# Patient Record
Sex: FEMALE | Race: WHITE | Employment: UNEMPLOYED | URBAN - METROPOLITAN AREA
[De-identification: names, ages, dates, MRNs, and addresses within clinical notes are randomized per-mention and may not be internally consistent; named-entity substitution may affect disease eponyms.]

---

## 2019-10-28 ENCOUNTER — OFFICE VISIT (OUTPATIENT)
Dept: FAMILY MEDICINE CLINIC | Age: 49
End: 2019-10-28

## 2019-10-28 VITALS
HEART RATE: 74 BPM | OXYGEN SATURATION: 100 % | HEIGHT: 67 IN | SYSTOLIC BLOOD PRESSURE: 148 MMHG | RESPIRATION RATE: 18 BRPM | DIASTOLIC BLOOD PRESSURE: 98 MMHG | BODY MASS INDEX: 27.15 KG/M2 | WEIGHT: 173 LBS | TEMPERATURE: 98.2 F

## 2019-10-28 DIAGNOSIS — G89.29 CHRONIC NONINTRACTABLE HEADACHE, UNSPECIFIED HEADACHE TYPE: ICD-10-CM

## 2019-10-28 DIAGNOSIS — K58.9 IRRITABLE BOWEL SYNDROME, UNSPECIFIED TYPE: ICD-10-CM

## 2019-10-28 DIAGNOSIS — K72.90 LIVER FAILURE WITHOUT HEPATIC COMA, UNSPECIFIED CHRONICITY (HCC): ICD-10-CM

## 2019-10-28 DIAGNOSIS — E07.9 THYROID DISEASE: ICD-10-CM

## 2019-10-28 DIAGNOSIS — K57.92 DIVERTICULITIS: ICD-10-CM

## 2019-10-28 DIAGNOSIS — R52 GENERALIZED BODY ACHES: Primary | ICD-10-CM

## 2019-10-28 DIAGNOSIS — R51.9 CHRONIC NONINTRACTABLE HEADACHE, UNSPECIFIED HEADACHE TYPE: ICD-10-CM

## 2019-10-28 RX ORDER — DULOXETIN HYDROCHLORIDE 60 MG/1
60 CAPSULE, DELAYED RELEASE ORAL DAILY
Qty: 30 CAP | Refills: 2 | Status: SHIPPED | OUTPATIENT
Start: 2019-10-28

## 2019-10-28 RX ORDER — DULOXETIN HYDROCHLORIDE 30 MG/1
30 CAPSULE, DELAYED RELEASE ORAL 2 TIMES DAILY
COMMUNITY
End: 2019-10-28 | Stop reason: SDUPTHER

## 2019-10-28 RX ORDER — BUTALBITAL, ACETAMINOPHEN AND CAFFEINE 300; 40; 50 MG/1; MG/1; MG/1
CAPSULE ORAL
COMMUNITY
End: 2020-09-29 | Stop reason: SDUPTHER

## 2019-10-28 RX ORDER — ZOLMITRIPTAN 5 MG/1
SPRAY NASAL
COMMUNITY
End: 2022-08-18

## 2019-10-28 RX ORDER — DULOXETIN HYDROCHLORIDE 30 MG/1
30 CAPSULE, DELAYED RELEASE ORAL DAILY
Qty: 30 CAP | Refills: 2 | Status: SHIPPED | OUTPATIENT
Start: 2019-10-28 | End: 2022-08-18

## 2019-10-28 RX ORDER — DICYCLOMINE HYDROCHLORIDE 10 MG/1
10 CAPSULE ORAL
COMMUNITY
End: 2020-07-15

## 2019-10-28 NOTE — PROGRESS NOTES
Bhupinder Gomez is a 51 y.o. female here today to establish care. She has a hx of migraines and thyroid issues. Recently moved from [place] in July. Patient declined the flu shot.

## 2019-10-28 NOTE — PROGRESS NOTES
Patient: Abe Tolbert  Date of Service: 10/28/2019   Provider: ABBI Rooney     REASON FOR VISIT:   Chief Complaint   Patient presents with    Migraine    Thyroid Problem        HISTORY OF PRESENT ILLNESS:   Abe Tolbert is a 51 y.o. female who presents to the office to establish care and for for follow up for Chronic health issues. Has a history of Migraines, Liver Failure last December from medication, Diverticulitis, IBS and Thyroid disease (Hashimoto). Migraine: take Fioricet as needed; have them 3 to 4 times a week but they are not as bad as they sued to be. Has aura, when she has migraine, she looses peripheral vision. Was seen Neurology. Diverticulitis/IBS: Has seen GI for this. Had colonoscopy last year and t was negative for polyps. Thyroid Disease: has Thyroid nodules that have been biopsied and were negative. Was seeing and Endocrinologist. And last saw him right before she moved to [place]. States that her last PCP and Endocrinologist recommended that she sees Rheumatology because of generalized body pain (ankles, neck, fingers upper back and severe cramps in feet and thighs that last for a long time). She was RA and Lupus and both tests were negative. She is very active, walks a lot but no cardio. Brought in medical record which will be reviewed. ALLERGIES:   No Known Allergies     ACTIVE MEDICAL PROBLEMS:  There is no problem list on file for this patient. MEDICATIONS:   Current Outpatient Medications   Medication Sig Dispense Refill    DULoxetine (CYMBALTA) 30 mg capsule Take 30 mg by mouth two (2) times a day.  butalbital-acetaminophen-caff (FIORICET) -40 mg per capsule Take  by mouth every four (4) hours as needed for Pain.  dicyclomine (BENTYL) 10 mg capsule Take 10 mg by mouth 4 times daily (before meals and nightly).  ZOLMitriptan (ZOMIG) 5 mg nasal solution by Nasal route once as needed for Migraine.       MAGNESIUM PO Take by mouth. ROS:   Pertinent positive as above in HPI. All others negative. PHYSICAL EXAMINATION:  Visit Vitals  BP (!) 148/98 (BP 1 Location: Right arm, BP Patient Position: Sitting)   Pulse 74   Temp 98.2 °F (36.8 °C)   Resp 18   Ht 5' 7\" (1.702 m)   Wt 173 lb (78.5 kg)   SpO2 100%   BMI 27.10 kg/m²      Body mass index is 27.1 kg/m². Appearance: alert, well appearing, and in no distress. ENT normal.  Neck supple. No adenopathy or thyromegaly. PERRLA. Lungs are clear, good air entry, no wheezes, rhonchi or rales. S1 and S2 normal, no murmurs, regular rate and rhythm. Abdomen soft without tenderness, guarding, mass or organomegaly. No bruit. Extremities show no edema, normal peripheral pulses. Neurological is normal, no focal findings. ASSESSMENT/PLAN:  Diagnoses and all orders for this visit:    1. Generalized body aches  -     REFERRAL TO RHEUMATOLOGY  -     DULoxetine (CYMBALTA) 30 mg capsule; Take 1 Cap by mouth daily.  -     DULoxetine (CYMBALTA) 60 mg capsule; Take 1 Cap by mouth daily. 2. Thyroid disease    3. Liver failure without hepatic coma, unspecified chronicity ([address]    4. Irritable bowel syndrome, unspecified type    5. Diverticulitis    6. Chronic nonintractable headache, unspecified headache type    Patient would like to just be referred to Rheumatology for now. She states that she is tired to going from one specialist to the next. If lab is abnormal she will ask for referral.     Patient advised to return to clinic if symptoms persist or to ED if they become worse  Patient verbalized understanding to above instructions. Follow-up and Dispositions    · Return in about 3 months (around 1/28/2020).          ABBI Urbano   10/28/2019   12:47 PM

## 2019-10-28 NOTE — PATIENT INSTRUCTIONS
Learning About the Low FODMAP Diet for Irritable Bowel Syndrome (IBS)  What is the low-FODMAP diet? A low-FODMAP diet is a way to find out what foods give you digestion problems. You stop eating certain high-FODMAP foods for about 2 months. Then you add them back to see how your body reacts. This is called a \"challenge diet. \" A dietitian or doctor can help you follow this diet. FODMAPs are carbohydrates. They are in many types of foods. FODMAP stands for:  · F ermentable. · O ligosaccharides. · D isaccharides. · M onosaccharides. · A nd p olyols. If you have digestive problems, some of these foods can make your symptoms worse. When you are on this diet, you can still eat certain fruits and vegetables. You can also eat certain grains, meats, fish, and lactose-free milks. What is it used for? If you have irritable bowel syndrome (IBS), you can ease your symptoms by not eating some types of foods. Some people also use this diet for inflammatory bowel disease (IBD) or some food intolerances. High-FODMAP foods can be hard to digest. They pull more fluid into your intestines. They are also easily fermented. This can lead to bloating, belly pain, gas, and diarrhea. The low-FODMAP diet can help you figure out what foods to avoid. And it can help you find foods that are easier to digest.  This diet can help with symptoms of some digestive diseases. But it's not a cure. You will still need to manage your condition. How does it work? You will work with a doctor or dietitian when you start the diet. At first, you won't eat any high-FODMAP foods for a few weeks. Go to www.[employer]. [employer] to learn more about this diet. Delma Goins also find links to an dayton for your phone or other device. You'll find low-FODMAP cookbooks there too. After 6 to 8 weeks, you will start to try high-FODMAP foods again. You will add those foods back to your diet, one group at a time.  Your doctor or dietitian will probably have you wait a few days before you add each new group of those foods. Keep a food diary. You can write down the foods you try and note how they make you feel. After a few weeks, you may have a better idea of what foods you should avoid and what foods make you feel your best.  What are the risks? There is some risk of not getting all of the vitamins and nutrients you need on the low-FODMAP diet. These include:  · Folate. · Thiamin. · Vitamin B6.  · Calcium. · Vitamin D. Your dietitian or doctor can help you find other sources of these if needed. This diet may limit your fiber intake. Try to plan your meals to include other sources of fiber. What foods are on the low-FODMAP diet? Here is a guide to foods that you can eat, plus the foods that you should avoid, when you are on the low-FODMAP diet. Grains  Okay to eat: Foods made from grains like arrowroot, buckwheat, corn, millet, and oats. You can also eat potato, quinoa, rice, sorghum, tapioca, and teff. Cereals, pasta, breads, corn tortillas and baked goods made from these grains are also okay. (These grains may be labeled \"gluten-free. \")  Avoid: Grains like wheat, barley, and rye. Avoid ingredients such as bulgur, couscous, durum, and semolina. And avoid cereals, breads, and pastas made from these grains. Avoid chickpea, lentil, and pea flour. Proteins  Okay to eat: Most meat, fish, and eggs without high-FODMAP sauces. You can have small amounts of almonds or hazelnuts (10 nuts). Macadamia nuts, peanuts, pecans, pine nuts, and walnuts are also okay. You can also eat cinthya and pumpkin seeds, tofu, and tempeh. Avoid: Beans, chickpeas, lentils, and soybeans. Avoid pistachio and cashew nuts. And some sausages may have high-FODMAP ingredients. Dairy  Okay to eat: Lactose-free dairy milks. Rice milk and almond milk are okay. So are lactose-free yogurts, kefirs, ice creams, and sorbet from low-FODMAP fruits and sweeteners. (These are often labeled \"lactose-free. \") You can have small amounts (2 Tbsp) of cottage, cream, or ricotta cheese. Hard cheeses like cheddar, [place], ROSS, and Swiss are okay. So are small amounts (1 oz) of aged or ripened cheeses like Brie, blue, and feta. Avoid: Milk, including cow, goat, and sheep. Avoid condensed or evaporated milk, buttermilk, custard, cream, sour cream, yogurt, and ice cream. Avoid soy milk. (Check sauces for dairy ingredients.)  Vegetables  Okay to eat: Bamboo shoots, bell peppers, bok jacky, up to ½ cup of broccoli or cabbage (red or white), and cucumbers. Eggplant, green beans, lettuce, olives, parsnips, and potatoes are okay to eat. So are pumpkin, rutabaga, seaweed, sprouts, Swiss chard, and spinach. You can eat scallions (green part only) and squash (not butternut). You can eat tomatoes, turnips, watercress, yams, and zucchini. You can also have small amounts of artichoke hearts (from can, 1 oz), carrots, corn (½ cob), and sweet potato (½ cup). Avoid: Artichokes, asparagus, [place] sprouts, ewa cabbage, cauliflower, and celery. And avoid garlic, leeks, mushrooms, okra, onions, scallions (white part), shallots, and peas. Fruits  Okay to eat: Bananas, blueberries, cantaloupe, coconut, grapes, and honeydew. Kiwi, rakan, limes, oranges, passion fruit, papaya, and pineapple are also okay. You can eat plantain, raspberries, rhubarb, star fruit, strawberries, tangelo, and tangerine. You can also have small amounts of dried banana chips (up to 10 chips), dried cranberries (1 Tbsp), and shredded coconut (up to ¼ cup). Avoid: Apples, applesauce, apricots, avocados, blackberries, boysenberries, and cherries. Also avoid dates, figs, grapefruit, guava, lychee, and mangoes. Don't eat nectarines, peaches, pears, persimmon, plums, prunes, tamarillo, or watermelon. And limit most canned and dried fruits.   Oils, spices, condiments, and sweeteners  Okay to eat: Vegetable oils (including garlic infused), butter, ghee, lard, and margarine (no trans fat). You can have most fresh herbs like basil, chives, coriander, ovidio, parsley, rosemary and thyme. You can have salt, jams made from low-FODMAP fruits, mayonnaise, and mustard. Soy sauce, hot sauce (no garlic), tamari, and vinegar are also okay. Sweeteners that are okay include sugar (sucrose), powdered (confectioner's) sugar, brown sugar, glucose, and maple syrup. You can also have some artificial sweeteners like aspartame, saccharine, and stevia. Avoid: Chutneys, hummus, jellies, garlic sauces, and gravies made with onion or garlic. Avoid pickles, relish, some salad dressings and soup stocks, salsa, and tomato paste. And avoid sauces and other foods with high fructose corn syrup, honey, molasses, and agave. Avoid artificial sweeteners (isomalt, mannitol, malitol, sorbitol, and xylitol). Avoid corn syrup solids, fructose, fruit juice concentrate, and polydextrose. Other foods and drinks  Okay to have: Water, soda water, tonic, soft drinks sweetened with sugar, ½ cup of low-FODMAP fruit juice, and most teas and alcohols. You can also eat foods made with baking powder and soda, cocoa, and gelatin. Avoid: Juices from high-FODMAP fruits and vegetables. And avoid fortified luisana, chamomile and fennel teas, chicory-based drinks and coffee substitutes, and bouillon cubes. Follow-up care is a key part of your treatment and safety. Be sure to make and go to all appointments, and call your doctor if you are having problems. It's also a good idea to know your test results and keep a list of the medicines you take. Where can you learn more? Go to http://gustavo-teresita.info/. Enter L235 in the search box to learn more about \"Learning About the Low FODMAP Diet for Irritable Bowel Syndrome (IBS). \"  Current as of: November 7, 2018  Content Version: 12.2  © 2173-5341 [employer]  Care instructions adapted under license by [employer] (which disclaims liability or warranty for this information). If you have questions about a medical condition or this instruction, always ask your healthcare professional. [place] [address] any warranty or liability for your use of this information.

## 2019-11-04 ENCOUNTER — TELEPHONE (OUTPATIENT)
Dept: FAMILY MEDICINE CLINIC | Age: 49
End: 2019-11-04

## 2019-11-04 NOTE — TELEPHONE ENCOUNTER
Rheumatology office called stating that by patients medication list, their fibromyalgia panel is closed and will need to be referred elsewhere.

## 2020-04-14 ENCOUNTER — VIRTUAL VISIT (OUTPATIENT)
Dept: NEUROLOGY | Age: 50
End: 2020-04-14

## 2020-04-14 VITALS — WEIGHT: 175 LBS | HEIGHT: 67 IN | BODY MASS INDEX: 27.47 KG/M2

## 2020-04-14 DIAGNOSIS — G43.119 INTRACTABLE MIGRAINE WITH AURA WITHOUT STATUS MIGRAINOSUS: Primary | ICD-10-CM

## 2020-04-14 RX ORDER — SUMATRIPTAN 6 MG/.5ML
6 INJECTION, SOLUTION SUBCUTANEOUS ONCE
COMMUNITY
End: 2020-07-15

## 2020-04-14 RX ORDER — RIZATRIPTAN BENZOATE 10 MG/1
10 TABLET ORAL
COMMUNITY
End: 2020-07-15

## 2020-04-14 RX ORDER — PROPRANOLOL HYDROCHLORIDE 20 MG/1
20 TABLET ORAL 2 TIMES DAILY
Qty: 60 TAB | Refills: 3 | Status: SHIPPED | OUTPATIENT
Start: 2020-04-14 | End: 2020-05-05 | Stop reason: SDUPTHER

## 2020-04-14 NOTE — PROGRESS NOTES
Chief Complaint   Patient presents with    Migraine    New Patient       Linda Flannery presents today for   Chief Complaint   Patient presents with    Migraine    New Patient       Is someone accompanying this pt? Virtual visit [phone]    Is the patient using any DME equipment during [address]? no    Depression Screening:  3 most recent PHQ Screens 10/28/2019   Little interest or pleasure in doing things Not at all   Feeling down, depressed, irritable, or hopeless Several days   Total Score PHQ 2 1       Learning Assessment:  Learning Assessment 10/28/2019   PRIMARY LEARNER Patient   HIGHEST LEVEL OF EDUCATION - PRIMARY LEARNER  GRADUATED HIGH SCHOOL OR GED   BARRIERS PRIMARY LEARNER VISUAL   CO-LEARNER CAREGIVER No   PRIMARY LANGUAGE ENGLISH   LEARNER PREFERENCE PRIMARY DEMONSTRATION   ANSWERED BY patient   RELATIONSHIP SELF       Abuse Screening:  No flowsheet data found. Fall Risk  No flowsheet data found. Coordination of Care:  1. Have you been to the ER, urgent care clinic since your last visit? Hospitalized since your last visit? no    2. Have you seen or consulted any other health care providers outside of the [address] [place] [address] since your last visit? Include any pap smears or colon screening.  no

## 2020-04-14 NOTE — PROGRESS NOTES
Consent: Kandis Mazariegos, who was seen by synchronous (real-time) audio-video technology, and/or her healthcare decision maker, is aware that this patient-initiated, Telehealth encounter on 4/14/2020 is a billable service, with coverage as determined by her insurance carrier. She is aware that she may receive a bill and has provided verbal consent to proceed: Yes. Assessment & Plan:   Diagnoses and all orders for this visit:    1. Intractable migraine with aura without status migrainosus  -     propranoloL (INDERAL) 20 mg tablet; Take 1 Tab by mouth two (2) times a day for 30 days. A 53years old female patient with history of migraine with aura currently not on any prophylaxis came for establishing care. Moved from [place] about 2 months ago. And had follow-up with a neurologist there. Had been on multiple prophylactic medications in the past.  For her chronic daily headache she was also on Botox but stopped because of insurance. Currently has about 15 headaches per month. Cao [place] was stopped because of liver failure. We will start her on propranolol 20 mg p.o. twice daily and will progressively increase the dose. We will continue with as needed Zomig. Advised on regular sleep and eating pattern. Nonmedical options like exercise and other relaxation techniques discussed. We will try to get records from her neurologist in [place]. We will see her in the clinic in 3 months time. Follow-up and Dispositions    · Return in about 3 months (around 7/14/2020). 712  Subjective:   Kandis Mazariegos is a 53 y.o. female who was seen for Migraine and New Patient  Mrs. Jose R Page is a 73-year-old female patient here for evaluation of headache. This is a virtual/video encounter. She moved from [place] about 2 months ago and used to follow for her headache with a neurologist there. She has been having headaches for about 20 years and is progressively getting worse through the years.   About 20 minutes before her headache she developed visual changes where she has difficulty seeing from the left side with occasional spots and zigzag lines. Headache is mostly on the left side. Is throbbing, mostly 4 out of 10 but before coming here it was about 8 out of 10. It is associated with nausea and vomiting. She has photophobia and phonophobia. It lasts for about 6 hours. Currently has about 15 headaches per month. Not on any prophylactic medications. Headache is relieved by Fioricet and also takes Zomig. Headache triggers include weather changes, lack of sleep, missing a meal.  She takes her pain medications about 2 times per week. Through the years she has been on different prophylactic medications including propranolol, topiramate, Elavil, Aimovig, and Botox. She developed liver failure while on Aimovig and stopped. She was doing good with Botox but could not be covered by her insurance and stopped it about a year ago. She said that she had MRIs of her brain in the past and she was told that were unremarkable. Prior to Admission medications    Medication Sig Start Date End Date Taking? Authorizing Provider   rizatriptan (MAXALT) 10 mg tablet Take 10 mg by mouth once as needed for Migraine. May repeat in 2 hours if needed   Yes Provider, Historical   SUMAtriptan (IMITREX) 6 mg/0.5 mL injection 6 mg by SubCUTAneous route once. Yes Provider, Historical   propranoloL (INDERAL) 20 mg tablet Take 1 Tab by mouth two (2) times a day for 30 days. 4/14/20 5/14/20 Yes Lis Marlow MD   butalbital-acetaminophen-caff (FIORICET) -40 mg per capsule Take  by mouth every four (4) hours as needed for Pain. Yes Provider, Historical   ZOLMitriptan (ZOMIG) 5 mg nasal solution by Nasal route once as needed for Migraine. Yes Provider, Historical   MAGNESIUM PO Take  by mouth. Yes Provider, Historical   dicyclomine (BENTYL) 10 mg capsule Take 10 mg by mouth 4 times daily (before meals and nightly). Provider, Historical   DULoxetine (CYMBALTA) 30 mg capsule Take 1 Cap by mouth daily. 10/28/19   ABBI Cuevas   DULoxetine (CYMBALTA) 60 mg capsule Take 1 Cap by mouth daily. 10/28/19   ABBI Cuevas     No Known Allergies        Review of Systems   Constitutional: Positive for malaise/fatigue (sometimes). Negative for chills, diaphoresis, fever and weight loss. HENT: Negative for hearing loss and tinnitus. Eyes: Positive for blurred vision (during the migriane). Negative for double vision. Respiratory: Negative for cough, shortness of breath and wheezing. Cardiovascular: Negative for chest pain and leg swelling. Gastrointestinal: Positive for nausea (with migraine) and vomiting. Negative for heartburn. Genitourinary: Negative for dysuria, frequency and urgency. Musculoskeletal: Positive for joint pain, myalgias (legs and shoulders) and neck pain. Negative for back pain. Skin: Negative for itching and rash. Neurological: Positive for dizziness (sometimes), tingling (hand right hand), sensory change, focal weakness (right hand weakness; drops) and headaches. Negative for tremors and seizures. Endo/Heme/Allergies: Bruises/bleeds easily. Psychiatric/Behavioral: Negative for depression. The patient has insomnia (wakes up multiple times; headache sometimes wakes her up).             Objective:   Vital Signs: (As obtained by patient/caregiver at home)  Visit Vitals  Ht 5' 7\" (1.702 m)   Wt 79.4 kg (175 lb)   BMI 27.41 kg/m²        [INSTRUCTIONS:  \"[x]\" Indicates a positive item  \"[]\" Indicates a negative item  -- DELETE ALL ITEMS NOT EXAMINED]    Constitutional: [x] Appears well-developed and well-nourished [x] No apparent distress      [] Abnormal -     Mental status: [x] Alert and awake  [x] Oriented to person/place/time [x] Able to follow commands    [] Abnormal -     Eyes:   EOM    [x]  Normal    [] Abnormal -   Sclera  []  Normal    [] Abnormal -          Discharge []  None visible   [] Abnormal -     HENT: [x] Normocephalic, atraumatic  [] Abnormal -   [] Mouth/Throat: Mucous membranes are moist    External Ears [] Normal  [] Abnormal -    Neck: [] No visualized mass [] Abnormal -     Pulmonary/Chest: [] Respiratory effort normal   [x] No visualized signs of difficulty breathing or respiratory distress        [] Abnormal -      Musculoskeletal:   [x] Normal gait with no signs of ataxia         [x] Normal range of motion of neck        [] Abnormal -     Neurological:        [x] No Facial Asymmetry (Cranial nerve 7 motor function) (limited exam due to video visit)          [x] No gaze palsy        [] Abnormal -       Mental status: Awake, alert, oriented x3, follows simple and complex commands. Speech and languge: fluent, coherent,  and comprehension intact  CN: EOMI, no facial asymmetry noted, moves her head from side to side without any difficulty, intact shoulder shrug,  tongue midline  Motor: no pronator drift, symmetric movement of the upper and lower extremities; normal gait. Coordination: Intact rapid alternating movement of the fingers, intact foot tapping, normal gait. Gait: Normal.       Skin:        [] No significant exanthematous lesions or discoloration noted on facial skin         [] Abnormal -            Psychiatric:       [x] Normal Affect [] Abnormal -        [] No Hallucinations    Other pertinent observable physical exam findings:-        We discussed the expected course, resolution and complications of the diagnosis(es) in detail. Medication risks, benefits, costs, interactions, and alternatives were discussed as indicated. I advised her to contact the office if her condition worsens, changes or fails to improve as anticipated. She expressed understanding with the diagnosis(es) and plan. Adriano Montelongo is a 53 y.o. female being evaluated by a video visit encounter for concerns as above. A caregiver was present when appropriate.  Due to this being a TeleHealth encounter (During MWWIQ-40 public health emergency), evaluation of the following organ systems was limited: Vitals/Constitutional/EENT/Resp/CV/GI//MS/Neuro/Skin/Heme-Lymph-Imm. Pursuant to the emergency declaration under the [address] [place] [address], [facility] waiver authority and the [employer] and [employer] Act, this Virtual  Visit was conducted, with patient's (and/or legal guardian's) consent, to reduce the patient's risk of exposure to COVID-19 and provide necessary medical care. Services were provided through a video synchronous discussion virtually to substitute for in-person clinic visit. Patient and provider were located at their individual homes.         Antonio Muro MD

## 2020-05-05 DIAGNOSIS — G43.119 INTRACTABLE MIGRAINE WITH AURA WITHOUT STATUS MIGRAINOSUS: ICD-10-CM

## 2020-05-05 NOTE — TELEPHONE ENCOUNTER
Requested Prescriptions     Pending Prescriptions Disp Refills    propranoloL (INDERAL) 20 mg tablet 60 Tab 3     Sig: Take 1 Tab by mouth two (2) times a day for 30 days.      Faxed script scanned to chart

## 2020-05-07 RX ORDER — PROPRANOLOL HYDROCHLORIDE 20 MG/1
20 TABLET ORAL 2 TIMES DAILY
Qty: 60 TAB | Refills: 3 | Status: SHIPPED | OUTPATIENT
Start: 2020-05-07 | End: 2020-06-06

## 2020-07-15 ENCOUNTER — OFFICE VISIT (OUTPATIENT)
Dept: NEUROLOGY | Age: 50
End: 2020-07-15

## 2020-07-15 VITALS
BODY MASS INDEX: 29.32 KG/M2 | SYSTOLIC BLOOD PRESSURE: 132 MMHG | HEIGHT: 67 IN | WEIGHT: 186.8 LBS | RESPIRATION RATE: 18 BRPM | DIASTOLIC BLOOD PRESSURE: 84 MMHG | HEART RATE: 64 BPM | OXYGEN SATURATION: 98 % | TEMPERATURE: 97.3 F

## 2020-07-15 DIAGNOSIS — G43.119 INTRACTABLE MIGRAINE WITH AURA WITHOUT STATUS MIGRAINOSUS: Primary | ICD-10-CM

## 2020-07-15 RX ORDER — TEMAZEPAM 15 MG/1
CAPSULE ORAL
COMMUNITY
Start: 2020-06-23 | End: 2022-08-18

## 2020-07-15 RX ORDER — LISINOPRIL 10 MG/1
TABLET ORAL
COMMUNITY
Start: 2020-06-26

## 2020-07-15 RX ORDER — BUTALBITAL, ACETAMINOPHEN, CAFFEINE AND CODEINE PHOSPHATE 50; 325; 40; 30 MG/1; MG/1; MG/1; MG/1
1 CAPSULE ORAL
Qty: 20 CAP | Refills: 3 | Status: SHIPPED | OUTPATIENT
Start: 2020-07-15 | End: 2020-09-29 | Stop reason: SDUPTHER

## 2020-07-15 RX ORDER — PROPRANOLOL HYDROCHLORIDE 20 MG/1
TABLET ORAL
COMMUNITY
End: 2020-07-29 | Stop reason: SDUPTHER

## 2020-07-15 RX ORDER — CHOLECALCIFEROL (VITAMIN D3) 125 MCG
CAPSULE ORAL
COMMUNITY
End: 2022-08-18

## 2020-07-15 NOTE — PROGRESS NOTES
Eliza Ag is a 53 y.o. female in office today for follow-up on migraines. 1. Have you been to the ER, urgent care clinic since your last visit? Hospitalized since your last visit? No    2. Have you seen or consulted any other health care providers outside of the [address] [place] [address] since your last visit? Include any pap smears or colon screening.  No

## 2020-07-15 NOTE — PROGRESS NOTES
Faviola Ford is a 53 y.o. female . presents for Migraine   . Mrs. Michelle Peterson is a 41-year-old female patient here for follow-up of migraine headache. She was last seen over the virtual encounter on April 14, 2020. She was started on propranolol 20 mg p.o. twice daily and continued with zolmitriptan nasal spray and Fioricet for acute attacks. She claims that her headache is better now. Currently has headache about 2 times per week (from a frequency of 15 headaches per month). Headache is mostly lateral, on the left side, throbbing, 8 out of 10 in severity. Lasts for about 2 hours. She has nausea and vomiting. Has photophobia but no phonophobia. Zolmitriptan nasal spray helps. Taking her propranolol properly. Major trigger is a change in the weather pattern. From previous encounter:  She moved from [place] about 2 months ago and used to follow for her headache with a neurologist there. She has been having headaches for about 20 years and is progressively getting worse through the years. About 20 minutes before her headache she developed visual changes where she has difficulty seeing from the left side with occasional spots and zigzag lines. Headache is mostly on the left side. Is throbbing, mostly 4 out of 10 but before coming here it was about 8 out of 10. It is associated with nausea and vomiting. She has photophobia and phonophobia. It lasts for about 6 hours. Currently has about 15 headaches per month. Not on any prophylactic medications. Headache is relieved by Fioricet and also takes Zomig. Headache triggers include weather changes, lack of sleep, missing a meal.  She takes her pain medications about 2 times per week. Through the years she has been on different prophylactic medications including propranolol, topiramate, Elavil, Aimovig, and Botox. She developed liver failure while on Aimovig and stopped.   She was doing good with Botox but could not be covered by her insurance and stopped it about a year ago. She said that she had MRIs of her brain in the past and she was told that were unremarkable. Review of Systems   Constitutional: Positive for weight loss. Negative for chills, fever and malaise/fatigue. HENT: Negative for hearing loss and tinnitus. Eyes: Negative for blurred vision and double vision. Respiratory: Negative for cough and shortness of breath. Cardiovascular: Negative for chest pain and leg swelling. Gastrointestinal: Positive for nausea (with the headache) and vomiting. Negative for heartburn. Genitourinary: Negative for dysuria, frequency and urgency. Musculoskeletal: Positive for back pain (occasionally), myalgias (shoulders, arm, the legs) and neck pain (sometimes). Skin: Negative for itching and rash. Neurological: Positive for dizziness (light headed), tingling (fingers; more on the left) and headaches. Negative for tremors and focal weakness. Endo/Heme/Allergies: Bruises/bleeds easily. Psychiatric/Behavioral: Positive for memory loss. Negative for depression. The patient is nervous/anxious. The patient does not have insomnia (takes temazepam).         Past Medical History:   Diagnosis Date    Diverticulitis     Elevated hemoglobin A1c     Headache     IBS (irritable bowel syndrome)     Insomnia     Liver failure (HCC)     Migraines     Renal angiomyolipoma     Renal mass     Thyroid disease     hashimoto    Thyroid nodule        Past Surgical History:   Procedure Laterality Date    HX CHOLECYSTECTOMY  2019    HX GYN  2017    Total hysterectomy        Family History   Problem Relation Age of Onset    Heart Disease Mother     Stroke Father     Heart Disease Father         Social History     Socioeconomic History    Marital status:      Spouse name: Not on file    Number of children: Not on file    Years of education: Not on file    Highest education level: Not on file   Occupational History    Not on file   Social Needs    Financial resource strain: Not on file    Food insecurity     Worry: Not on file     Inability: Not on file    Transportation needs     Medical: Not on file     Non-medical: Not on file   Tobacco Use    Smoking status: Never Smoker    Smokeless tobacco: Never Used   Substance and Sexual Activity    Alcohol use: Never     Frequency: Never    Drug use: Never    Sexual activity: Not on file   Lifestyle    Physical activity     Days per week: Not on file     Minutes per session: Not on file    Stress: Not on file   Relationships    Social connections     Talks on phone: Not on file     Gets together: Not on file     Attends Protestant service: Not on file     Active member of club or organization: Not on file     Attends meetings of clubs or organizations: Not on file     Relationship status: Not on file    Intimate partner violence     Fear of current or ex partner: Not on file     Emotionally abused: Not on file     Physically abused: Not on file     Forced sexual activity: Not on file   Other Topics Concern    Not on file   Social History Narrative    Not on file        No Known Allergies      Current Outpatient Medications   Medication Sig Dispense Refill    propranoloL (INDERAL) 20 mg tablet propranolol 20 mg tablet   Take 1 tablet twice a day by oral route.  lisinopriL (PRINIVIL, ZESTRIL) 10 mg tablet take 1 tablet by mouth once daily      temazepam (RESTORIL) 15 mg capsule take 1 capsule by mouth at bedtime      melatonin 5 mg tablet Take  by mouth nightly.  butalbital-acetaminophen-caff (FIORICET) -40 mg per capsule Take  by mouth every four (4) hours as needed for Pain.  ZOLMitriptan (ZOMIG) 5 mg nasal solution by Nasal route once as needed for Migraine.  MAGNESIUM PO Take 1,000 mg by mouth.  DULoxetine (CYMBALTA) 30 mg capsule Take 1 Cap by mouth daily. 30 Cap 2    DULoxetine (CYMBALTA) 60 mg capsule Take 1 Cap by mouth daily.  30 Cap 2         Physical Exam  Constitutional:       Appearance: Normal appearance. HENT:      Head: Normocephalic and atraumatic. Mouth/Throat:      Mouth: Mucous membranes are moist.      Pharynx: Oropharynx is clear. Eyes:      Extraocular Movements: Extraocular movements intact. Pupils: Pupils are equal, round, and reactive to light. Neck:      Musculoskeletal: Normal range of motion and neck supple. Pulmonary:      Effort: Pulmonary effort is normal. No respiratory distress. Musculoskeletal: Normal range of motion. Right lower leg: No edema. Left lower leg: No edema. Neurological:      Mental Status: She is alert. Comments: Mental status: Awake, alert, oriented x3, follows simple and complex commands, no neglect, no extinction to DSS or VSS. Speech and languge: fluent, coherent, naming and repitition intact, reading and comprehension intact  CN: Funduscopy showed clear disc margins; VFF, EOMI, PERRLA, face sensation intact , no facial asymmetry noted, palate elevation symmetric bilat, SS+SCM 5/5 bilat, tongue midline  Motor: no pronator drift, tone normal throughout, strength 5/5 throughout  Sensory: intact to light touch and pinprick throughout  Coordination: FNF, HS accurate w/o dysmetria  DTR: 2+ throughout. Gait: Normal.            Hospital Outpatient Visit on 06/09/2020   Component Date Value Ref Range Status    Sodium 06/09/2020 137  136 - 145 mEq/L Final    Potassium 06/09/2020 4.8  3.5 - 5.1 mEq/L Final    Chloride 06/09/2020 107  98 - 107 mEq/L Final    CO2 06/09/2020 26  21 - 32 mEq/L Final    Glucose 06/09/2020 83  74 - 106 mg/dl Final    BUN 06/09/2020 18  7 - 25 mg/dl Final    Creatinine 06/09/2020 0.8  0.6 - 1.3 mg/dl Final    GFR est AA 06/09/2020 >60.0    Final    Comment: THE [facility] STATES THAT THE MDRD STUDY EQUATION SHOULD ONLY BE USED ON  INDIVIDUALS 18 OR OLDER.  THE REPORT ALSO NOTES THAT THE MDRD STUDY EQUATION HAS NOT BEEN  VALIDATED FOR USE WITH THE ELDERLY (OVER 79YEARS OF AGE), PREGNANT WOMEN, PATIENTS WITH SERIOUS  COMORBID CONDITIONS, OR PERSONS WITH EXTREMES OF BODY SIZE, MUSCLE MASS, OR NUTRITIONAL STATUS. APPLICATION OF THE EQUATION TO THESE PATIENT GROUPS MAY LEAD TO ERRORS IN GFR ESTIMATION. GFR  ESTIMATING EQUATIONS HAVE POORER AGREEMENT WITH MEASURED GFR FOR ILL HOSPITALIZED PATIENTS AND  FOR PEOPLE WITH NEAR NORMAL KIDNEY FUNCTION THAN FOR SUBJECTS IN THE MDRD STUDY. VALIDATION  STUDIES ARE IN PROGRESS TO EVALUATE THE MDRD STUDY EQUATION FOR ADDITIONAL ETHNIC GROUPS, THE  ELDERLY, VARIOUS DISEASE CONDITIONS, AND PEOPLE WITH NORMAL KIDNEY FUNCTION. GFRA----REFERS TO   GFRO---REFERS TO OTHER RACES  REFERENCES AVAILABLE UPON REQUEST.      GFR est non-AA 06/09/2020 >60    Final    Calcium 06/09/2020 9.1  8.5 - 10.1 mg/dl Final    AST (SGOT) 06/09/2020 15  15 - 37 U/L Final    ALT (SGPT) 06/09/2020 17  12 - 78 U/L Final    Alk. phosphatase 06/09/2020 56  45 - 117 U/L Final    Bilirubin, total 06/09/2020 0.4  0.2 - 1.0 mg/dl Final    Protein, total 06/09/2020 8.2  6.4 - 8.2 gm/dl Final    Albumin 06/09/2020 4.3  3.4 - 5.0 gm/dl Final    Anion gap 06/09/2020 4* 5 - 15 mmol/L Final    WBC 06/09/2020 5.8  4.0 - 11.0 1000/mm3 Final    RBC 06/09/2020 4.73  3.60 - 5.20 M/uL Final    HGB 06/09/2020 13.4  13.0 - 17.2 gm/dl Final    HCT 06/09/2020 43.9  37.0 - 50.0 % Final    MCV 06/09/2020 92.8  80.0 - 98.0 fL Final    MCH 06/09/2020 28.3  25.4 - 34.6 pg Final    MCHC 06/09/2020 30.5  30.0 - 36.0 gm/dl Final    PLATELET 69/47/1357 973  140 - 450 1000/mm3 Final    MPV 06/09/2020 11.8* 6.0 - 10.0 fL Final    RDW-SD 06/09/2020 49.0* 36.4 - 46.3   Final    NRBC 06/09/2020 0  0 - 0   Final    IMMATURE GRANULOCYTES 06/09/2020 0.2  0.0 - 3.0 % Final    Comment: IG - Immature granulocytes (promyelocytes + myelocytes + metamyelocytes), their presence  indicates a left shift.  An IG > 3% may predict positive blood cultures with 98% specificity.  (P<0.04) and 92% Positive Predictive Value (Joanie-Kemi)1. Increased immature granulocytes  assist with the detection of infection and/or inflammation and may be present at an early stage  and are more sensitive and specific than band counts.  NEUTROPHILS 06/09/2020 61.8  34 - 64 % Final    LYMPHOCYTES 06/09/2020 31.6  28 - 48 % Final    MONOCYTES 06/09/2020 5.2  1 - 13 % Final    EOSINOPHILS 06/09/2020 0.9  0 - 5 % Final    BASOPHILS 06/09/2020 0.3  0 - 3 % Final    TSH 06/09/2020 4.490* 0.358 - 3.740 mIU/ml Final    WBC 06/09/2020 OCCASIONAL  /HPF Final    Hemoglobin A1c 06/09/2020 5.8* 4.2 - 5.6 % Final    Comment: DIABETIC = GREATER THAN OR EQUALS TO 6.5%  PREDIABETIC = 5.7 - 6.4%  IF A1C % IS:               YOUR ESTIMATED AVERAGE GLUCOSE IS:  4.0                        68 mg/dL  4.5                        82 mg/dL  5.0                        97 mg/dL  5.5                        111 mg/dL  6                          126 mg/dL  6.5                        140 mg/dL  7                          154 mg/dL  7.5                        169 mg/dL  8                          183 mg/dL  8.5                        197 mg/dL  9                          212 mg/dL  9.5                        226 mg/dL  10                         240 mg/dL  10.5                       255 mg/dL  11                         269 mg/dL  11.5                       283 mg/dL  12                         298 mg/dL      Cholesterol, total 06/09/2020 201* 140 - 199 mg/dl Final    HDL Cholesterol 06/09/2020 77  40 - 96 mg/dl Final    Triglyceride 06/09/2020 85  29 - 150 mg/dl Final    LDL, calculated 06/09/2020 107  0 - 130 mg/dl Final    Comment: TARGET/DECISION VALUES DEPEND ON A NUMBER OF RISK FACTORS. LDL CALCULATION NOT VALID IF TRIGLYCERIDES ARE GREATER THAN 400 mg/dL.       CHOL/HDL Ratio 06/09/2020 2.6  0.0 - 4.4 Ratio Final    Free T4 06/09/2020 0.94  0.76 - 1.46 ng/dl Final    Triiodothyronine (T3), free 06/09/2020 2.59  2.18 - 3.98 pg/ml Final             ICD-10-CM ICD-9-CM    1. Intractable migraine with aura without status migrainosus  G43.119 346.01 codeine-butalbital-acetaminophen-caffeine (FIORICET WITH CODEINE) -89-30 mg capsule       A 53years old female patient with history of migraine with aura here for follow-up. Currently on propranolol 20 mg p.o. twice daily. Since her last visit was 3 months ago, direct frequency has decreased to twice a week. Claims the propranolol has also helped her with the severity. Takes Zomig nasal spray for acute attacks; alternatively with Fioricet. Major trigger is a change in the weather pattern. We will continue the same dose of Fioricet. Advised her to follow her blood pressure and heart rate closely. She has mentioned some problem with short-term memory. But she has well-oriented and her exam looks normal.  We will follow her for any worsening. We will see her in 3 months time.

## 2020-07-16 ENCOUNTER — TELEPHONE (OUTPATIENT)
Dept: NEUROLOGY | Age: 50
End: 2020-07-16

## 2020-07-19 DIAGNOSIS — G43.119 INTRACTABLE MIGRAINE WITH AURA WITHOUT STATUS MIGRAINOSUS: ICD-10-CM

## 2020-07-20 RX ORDER — BUTALBITAL, ACETAMINOPHEN, CAFFEINE AND CODEINE PHOSPHATE 50; 325; 40; 30 MG/1; MG/1; MG/1; MG/1
1 CAPSULE ORAL
Qty: 20 CAP | Refills: 3 | Status: CANCELLED | OUTPATIENT
Start: 2020-07-20 | End: 2020-10-20

## 2020-07-22 DIAGNOSIS — G43.119 INTRACTABLE MIGRAINE WITH AURA WITHOUT STATUS MIGRAINOSUS: ICD-10-CM

## 2020-07-28 RX ORDER — BUTALBITAL, ACETAMINOPHEN, CAFFEINE AND CODEINE PHOSPHATE 50; 325; 40; 30 MG/1; MG/1; MG/1; MG/1
1 CAPSULE ORAL
Qty: 20 CAP | Refills: 3 | Status: CANCELLED | OUTPATIENT
Start: 2020-07-28 | End: 2020-10-28

## 2020-07-29 DIAGNOSIS — G43.119 INTRACTABLE MIGRAINE WITH AURA WITHOUT STATUS MIGRAINOSUS: Primary | ICD-10-CM

## 2020-07-29 RX ORDER — PROPRANOLOL HYDROCHLORIDE 20 MG/1
20 TABLET ORAL 2 TIMES DAILY
Qty: 60 TAB | Refills: 3 | Status: SHIPPED | OUTPATIENT
Start: 2020-07-29 | End: 2020-08-28

## 2020-07-29 RX ORDER — PROPRANOLOL HYDROCHLORIDE 20 MG/1
TABLET ORAL
Qty: 60 TAB | Refills: 3 | Status: SHIPPED | OUTPATIENT
Start: 2020-07-29 | End: 2020-07-29 | Stop reason: SDUPTHER

## 2020-08-12 ENCOUNTER — TELEPHONE (OUTPATIENT)
Dept: NEUROLOGY | Age: 50
End: 2020-08-12

## 2020-08-12 NOTE — TELEPHONE ENCOUNTER
Pt's  states that pt's Fioricet medication was sent directly to [facility]. He states that the [facility] Then [facility] will reach out to the patient for future refills.

## 2020-09-04 ENCOUNTER — TELEPHONE (OUTPATIENT)
Dept: NEUROLOGY | Age: 50
End: 2020-09-04

## 2020-09-23 NOTE — TELEPHONE ENCOUNTER
Becki at [facility] states that the medication clarification request sent was incorrect. She says that Fioricet with codeine is not covered by pt's insurance. So, they want to change the prescription order to butalbital-acetaminophen-caffeine. [facility] wants to know if Dr. Helga Tobar wants to order tablet form -40 or capsule form -40.

## 2020-09-29 DIAGNOSIS — G43.119 INTRACTABLE MIGRAINE WITH AURA WITHOUT STATUS MIGRAINOSUS: Primary | ICD-10-CM

## 2020-09-29 RX ORDER — BUTALBITAL, ACETAMINOPHEN AND CAFFEINE 300; 40; 50 MG/1; MG/1; MG/1
1 CAPSULE ORAL
Qty: 20 CAP | Refills: 5 | Status: SHIPPED | OUTPATIENT
Start: 2020-09-29

## 2021-01-21 RX ORDER — PROPRANOLOL HYDROCHLORIDE 20 MG/1
20 TABLET ORAL 2 TIMES DAILY
Qty: 60 TAB | Refills: 3 | Status: SHIPPED | OUTPATIENT
Start: 2021-01-21 | End: 2021-02-20

## 2021-01-21 RX ORDER — PROPRANOLOL HYDROCHLORIDE 20 MG/1
TABLET ORAL 3 TIMES DAILY
COMMUNITY
End: 2021-01-21 | Stop reason: SDUPTHER

## 2022-08-18 PROBLEM — K35.80 ACUTE APPENDICITIS: Status: ACTIVE | Noted: 2022-08-18

## 2022-08-18 PROBLEM — K35.30 ACUTE APPENDICITIS WITH LOCALIZED PERITONITIS, WITHOUT PERFORATION, ABSCESS, OR GANGRENE: Status: ACTIVE | Noted: 2022-08-18

## 2022-08-18 PROBLEM — K35.30 ACUTE APPENDICITIS WITH LOCALIZED PERITONITIS, WITHOUT PERFORATION, ABSCESS, OR GANGRENE: Status: RESOLVED | Noted: 2022-08-18 | Resolved: 2022-08-18

## 2023-01-31 RX ORDER — VERAPAMIL HYDROCHLORIDE 240 MG/1
240 CAPSULE, EXTENDED RELEASE ORAL DAILY
COMMUNITY

## 2023-01-31 RX ORDER — LEVOTHYROXINE SODIUM 0.03 MG/1
25 TABLET ORAL
COMMUNITY

## 2023-01-31 RX ORDER — DULOXETIN HYDROCHLORIDE 60 MG/1
60 CAPSULE, DELAYED RELEASE ORAL DAILY
COMMUNITY
Start: 2019-10-28

## 2023-01-31 RX ORDER — ACETAMINOPHEN 500 MG
1000 TABLET ORAL EVERY 6 HOURS PRN
COMMUNITY
Start: 2022-08-18

## 2023-01-31 RX ORDER — ATOGEPANT 60 MG/1
60 TABLET ORAL DAILY
COMMUNITY

## 2023-01-31 RX ORDER — ESZOPICLONE 2 MG/1
2 TABLET, FILM COATED ORAL
COMMUNITY

## 2023-01-31 RX ORDER — DICYCLOMINE HYDROCHLORIDE 10 MG/5ML
20 SOLUTION ORAL 4 TIMES DAILY
COMMUNITY
Start: 2021-05-26

## 2023-01-31 RX ORDER — LISINOPRIL 10 MG/1
TABLET ORAL
COMMUNITY
Start: 2020-06-26

## 2023-01-31 RX ORDER — BUTALBITAL, ACETAMINOPHEN AND CAFFEINE 300; 40; 50 MG/1; MG/1; MG/1
1 CAPSULE ORAL
COMMUNITY
Start: 2020-09-29

## 2023-01-31 RX ORDER — IBUPROFEN 200 MG
400 TABLET ORAL EVERY 6 HOURS PRN
COMMUNITY
Start: 2022-08-18